# Patient Record
(demographics unavailable — no encounter records)

---

## 2021-01-01 NOTE — ROPEDSPDOC
Peds Procedure Note


Procedure


DATE OF PROCEDURE: 21 





PREPROCEDURE DIAGNOSIS: Uncircumcised male   





POSTPROCEDURE DIAGNOSIS:  





PROCEDURE:  circumcision with Gomco clamp 





SURGEON: Dr. Montesinos 





ASSISTANT:  





ANESTHESIA: Local anesthesia nerve block 





DESCRIPTION OF PROCEDURE: I administered the local anesthesia nerve block. After

adequate anesthesia had been accomplished I loosened and retracted the foreskin.

I applied the Gomco clamp device. After about 1 minute of hemostasis I removed 

the foreskin with a scalpel. The procedure was uncomplicated and well tolerated.

Result was good. Pain management was excellent. Blood loss was minimal less than

0.5 mL.











Ponce Montesinos MD                  2021 13:52

## 2021-01-01 NOTE — NBADM
[place] [place] Admission Note


Date of Admission


2021 at 10:43





History


This is a baby early term twin male  born at 38-1/7 weeks of gestational age via

 to a 27-year-old  (G) 5  para (P) now 4 mother who is blood 

type AB+, hepatitis B negative, rapid plasma reagin (RPR) negative, HIV 

negative, group B Streptococcus negative. Pregnancy was complicated by maternal 

smoking and use of cocaine. Mother tested positive for cocaine in 2021. 

Rupture of membranes at the time of delivery. Baby was delivered in breech 

position. Apgar scores were 9 at one minute and 9 at five minutes. Baby was 

admitted to the Mother-Baby unit.





Physical Examination


Physical Measurements


On admission, the baby's weight is 2550 grams which is 5 pounds and 10 ounces, 

length is 19 inches, and head circumference is 14 inches.


Vital Signs





Vital Signs








  Date Time  Temp Pulse Resp B/P (MAP) Pulse Ox O2 Delivery O2 Flow Rate FiO2


 


21 11:10 97.6 142 60 56/32 (40) 99 Room Air  








General:  Positive: Active, Other (appropriately responsive); 


   Negative: Dysmorphic Features


HEENT:  Positive: Normocephalic, Anterior [place] Open, Positive Red Reflexes

[place]


Heart:  Positive: S1,S2; 


   Negative: Murmur


Lungs:  Positive: Good Bilateral Air Entry; 


   Negative: Grunting and Retractions


Abdomen:  Positive: Soft; 


   Negative: Distended


Male Genitalia:  Positive: Nl Term Male Genitalia


Extremities:  Positive: Other (both hips stable with normal Ortolani and Francois 

maneuvers)


Skin:  Positive: Normal for Gestation, Normal Capillary Refill


Neurological:  POSITIVE: Good Tone, Positive Glenna Reflex





Asessment


Problems:  


(1) Healthy male 


Problem Text:  Early term delivered by  at 38-1/7 weeks' gestational 

age.








Plan


1. Admit to mother-baby unit.


2. Routine  care.


3. Both parents updated on condition and plan for the baby.











Ponce Montesinos MD                  2021 12:51

## 2021-01-01 NOTE — DS.PDOC
[place] Discharge Summary


General


Date of Birth


21


Date of Discharge


21





Procedures During Visit


Hearing screen and BiliChek were performed.


Circumcision performed  by Dr. Montesinos





History


This is a baby early term twin male  born at 38-1/7 weeks of gestational age via

 to a 27-year-old  (G) 5  para (P) now 4 mother who is blood 

type AB+, hepatitis B negative, rapid plasma reagin (RPR) negative, HIV 

negative, group B Streptococcus negative. Pregnancy was complicated by maternal 

smoking and use of cocaine. Mother tested positive for cocaine in 2021. 

Rupture of membranes at the time of delivery. Baby was delivered in breech 

position. Apgar scores were 9 at one minute and 9 at five minutes. Baby was 

admitted to the Mother-Baby unit.





Exam on Admission to Nursery


Measurements on Admission


On admission, the baby's weight is 2550 grams which is 5 pounds and 10 ounces, 

length is 19 inches, and head circumference is 14 inches.


General:  Positive: Active, Other (appropriately responsive); 


   Negative: Dysmorphic Features


HEENT:  Positive: Normocephalic, Anterior [place] Open, Positive Red Reflexes

[place]


Heart:  Positive: S1,S2; 


   Negative: Murmur


Lungs:  Positive: Good Bilateral Air Entry; 


   Negative: Grunting and Retractions


Abdomen:  Positive: Soft; 


   Negative: Distended


Male Genitalia:  Positive: Nl Term Male Genitalia


Extremities:  Positive: Other (both hips stable with normal Ortolani and Francois 

maneuvers)


Skin:  Positive: Normal for Gestation, Normal Capillary Refill


Neurological:  POSITIVE: Good Tone, Positive Glenna Reflex





Summary Text


On the day of discharge, the baby's weight is 2446 grams which is 5 pounds and 6

ounces and the baby is feeding well on ProSobee formula. The child was fairly 

spitty on Enfamil so we changed his formula to ProSobee..


Physical Examination was within normal limits. The child was active and 

responsive. He was breathing comfortably with clear breath sounds. His heart was

regular with no murmur and his abdomen was soft and nondistended. His 

circumcision is healing well. I showed the person who is going to be providing 

respite care how to apply Vaseline to the circumcision with each diaper change 

for 2 more days.


The baby passed a hearing screen, received the first dose of hepatitis B vaccine

on 16. . Bilirubin check is 4 at 44 hours of life.


The child is being discharged into the custody of a respite care provider by 

court order. [facility]. I instructed the care 

provider to call the office tomorrow to schedule follow-up. I will fax a summary

of the child's Hospital course to the office..











Ponce Montesinos MD                  2021 17:44